# Patient Record
Sex: MALE | Race: WHITE | NOT HISPANIC OR LATINO | URBAN - METROPOLITAN AREA
[De-identification: names, ages, dates, MRNs, and addresses within clinical notes are randomized per-mention and may not be internally consistent; named-entity substitution may affect disease eponyms.]

---

## 2017-12-11 ENCOUNTER — APPOINTMENT (RX ONLY)
Dept: URBAN - METROPOLITAN AREA OTHER 4 | Facility: OTHER | Age: 62
Setting detail: DERMATOLOGY
End: 2017-12-11

## 2017-12-11 DIAGNOSIS — L81.4 OTHER MELANIN HYPERPIGMENTATION: ICD-10-CM

## 2017-12-11 DIAGNOSIS — D22 MELANOCYTIC NEVI: ICD-10-CM

## 2017-12-11 DIAGNOSIS — L91.8 OTHER HYPERTROPHIC DISORDERS OF THE SKIN: ICD-10-CM

## 2017-12-11 DIAGNOSIS — L82.1 OTHER SEBORRHEIC KERATOSIS: ICD-10-CM

## 2017-12-11 DIAGNOSIS — F17.200 NICOTINE DEPENDENCE, UNSPECIFIED, UNCOMPLICATED: ICD-10-CM

## 2017-12-11 DIAGNOSIS — L57.0 ACTINIC KERATOSIS: ICD-10-CM

## 2017-12-11 DIAGNOSIS — D18.0 HEMANGIOMA: ICD-10-CM

## 2017-12-11 PROBLEM — D22.5 MELANOCYTIC NEVI OF TRUNK: Status: ACTIVE | Noted: 2017-12-11

## 2017-12-11 PROBLEM — I10 ESSENTIAL (PRIMARY) HYPERTENSION: Status: ACTIVE | Noted: 2017-12-11

## 2017-12-11 PROBLEM — D18.01 HEMANGIOMA OF SKIN AND SUBCUTANEOUS TISSUE: Status: ACTIVE | Noted: 2017-12-11

## 2017-12-11 PROCEDURE — ? LIQUID NITROGEN

## 2017-12-11 PROCEDURE — 99203 OFFICE O/P NEW LOW 30 MIN: CPT | Mod: 25

## 2017-12-11 PROCEDURE — ? BENIGN DESTRUCTION COSMETIC

## 2017-12-11 PROCEDURE — 17000 DESTRUCT PREMALG LESION: CPT

## 2017-12-11 PROCEDURE — ? COUNSELING

## 2017-12-11 ASSESSMENT — LOCATION ZONE DERM
LOCATION ZONE: AXILLAE
LOCATION ZONE: FACE
LOCATION ZONE: TRUNK
LOCATION ZONE: NOSE
LOCATION ZONE: ARM
LOCATION ZONE: LIP

## 2017-12-11 ASSESSMENT — LOCATION DETAILED DESCRIPTION DERM
LOCATION DETAILED: LEFT SUPERIOR MEDIAL UPPER BACK
LOCATION DETAILED: RIGHT INFERIOR CENTRAL MALAR CHEEK
LOCATION DETAILED: LEFT ANTERIOR MEDIAL PROXIMAL UPPER ARM
LOCATION DETAILED: LEFT SUPERIOR VERMILION LIP
LOCATION DETAILED: LEFT INFERIOR CENTRAL MALAR CHEEK
LOCATION DETAILED: LEFT NASAL SIDEWALL
LOCATION DETAILED: LEFT AXILLARY VAULT
LOCATION DETAILED: RIGHT CENTRAL MALAR CHEEK
LOCATION DETAILED: LEFT MID-UPPER BACK
LOCATION DETAILED: RIGHT AXILLARY VAULT

## 2017-12-11 ASSESSMENT — LOCATION SIMPLE DESCRIPTION DERM
LOCATION SIMPLE: LEFT NOSE
LOCATION SIMPLE: LEFT UPPER BACK
LOCATION SIMPLE: RIGHT CHEEK
LOCATION SIMPLE: LEFT LIP
LOCATION SIMPLE: LEFT AXILLARY VAULT
LOCATION SIMPLE: RIGHT AXILLARY VAULT
LOCATION SIMPLE: LEFT UPPER ARM
LOCATION SIMPLE: LEFT CHEEK

## 2017-12-11 ASSESSMENT — TOTAL NUMBER OF LESIONS: # OF LESIONS?: 1

## 2017-12-11 ASSESSMENT — PAIN INTENSITY VAS: HOW INTENSE IS YOUR PAIN 0 BEING NO PAIN, 10 BEING THE MOST SEVERE PAIN POSSIBLE?: NO PAIN

## 2017-12-11 NOTE — PROCEDURE: LIQUID NITROGEN
Post-Care Instructions: I reviewed with the patient in detail post-care instructions. Patient is to wear sunprotection, and avoid picking at any of the treated lesions. Pt may apply Vaseline to crusted or scabbing areas.
Detail Level: Simple
Render Post-Care Instructions In Note?: no
Consent: The patient's consent was obtained including but not limited to risks of crusting, scabbing, blistering, scarring, darker or lighter pigmentary change, recurrence, incomplete removal and infection.
Duration Of Freeze Thaw-Cycle (Seconds): 5
Number Of Freeze-Thaw Cycles: 1 freeze-thaw cycle

## 2017-12-11 NOTE — PROCEDURE: BENIGN DESTRUCTION COSMETIC
Post-Care Instructions: I reviewed with the patient in detail post-care instructions. Patient is to wear sunprotection, and avoid picking at any of the treated lesions. Pt may apply Vaseline to crusted or scabbing areas.
Consent: The patient's consent was obtained including but not limited to risks of crusting, scabbing, blistering, scarring, darker or lighter pigmentary change, recurrence, incomplete removal and infection.
Anesthesia Type: 1% lidocaine without epinephrine
Price (Use Numbers Only, No Special Characters Or $): 0
Anesthesia Volume In Cc: 0.2
Detail Level: Simple

## 2018-07-25 ENCOUNTER — APPOINTMENT (RX ONLY)
Dept: URBAN - METROPOLITAN AREA OTHER 4 | Facility: OTHER | Age: 63
Setting detail: DERMATOLOGY
End: 2018-07-25

## 2018-07-25 DIAGNOSIS — I78.1 NEVUS, NON-NEOPLASTIC: ICD-10-CM

## 2018-07-25 DIAGNOSIS — L81.5 LEUKODERMA, NOT ELSEWHERE CLASSIFIED: ICD-10-CM

## 2018-07-25 DIAGNOSIS — L72.0 EPIDERMAL CYST: ICD-10-CM

## 2018-07-25 DIAGNOSIS — L81.4 OTHER MELANIN HYPERPIGMENTATION: ICD-10-CM

## 2018-07-25 PROCEDURE — ? BENIGN DESTRUCTION COSMETIC

## 2018-07-25 PROCEDURE — ? COUNSELING

## 2018-07-25 PROCEDURE — 99213 OFFICE O/P EST LOW 20 MIN: CPT

## 2018-07-25 ASSESSMENT — LOCATION DETAILED DESCRIPTION DERM
LOCATION DETAILED: LEFT INFERIOR CENTRAL MALAR CHEEK
LOCATION DETAILED: RIGHT NASAL ALA
LOCATION DETAILED: EPIGASTRIC SKIN
LOCATION DETAILED: LEFT LATERAL MALAR CHEEK
LOCATION DETAILED: LEFT PROXIMAL PRETIBIAL REGION
LOCATION DETAILED: RIGHT DISTAL PRETIBIAL REGION
LOCATION DETAILED: LEFT CENTRAL MALAR CHEEK
LOCATION DETAILED: RIGHT CENTRAL MALAR CHEEK
LOCATION DETAILED: RIGHT INFERIOR CENTRAL MALAR CHEEK
LOCATION DETAILED: RIGHT NASAL SIDEWALL
LOCATION DETAILED: STERNUM
LOCATION DETAILED: RIGHT SUPERIOR LATERAL MALAR CHEEK

## 2018-07-25 ASSESSMENT — LOCATION SIMPLE DESCRIPTION DERM
LOCATION SIMPLE: LEFT PRETIBIAL REGION
LOCATION SIMPLE: RIGHT NOSE
LOCATION SIMPLE: CHEST
LOCATION SIMPLE: RIGHT PRETIBIAL REGION
LOCATION SIMPLE: ABDOMEN
LOCATION SIMPLE: LEFT CHEEK
LOCATION SIMPLE: RIGHT CHEEK

## 2018-07-25 ASSESSMENT — LOCATION ZONE DERM
LOCATION ZONE: TRUNK
LOCATION ZONE: NOSE
LOCATION ZONE: FACE
LOCATION ZONE: LEG

## 2018-07-25 NOTE — PROCEDURE: BENIGN DESTRUCTION COSMETIC
Consent: The patient's consent was obtained including but not limited to risks of crusting, scabbing, blistering, scarring, darker or lighter pigmentary change, recurrence, incomplete removal and infection.
Detail Level: Simple
Anesthesia Type: 1% lidocaine without epinephrine
Price (Use Numbers Only, No Special Characters Or $): 0
Anesthesia Volume In Cc: 0.2
Post-Care Instructions: I reviewed with the patient in detail post-care instructions. Patient is to wear sunprotection, and avoid picking at any of the treated lesions. Pt may apply Vaseline to crusted or scabbing areas.
Detail Level: Zone

## 2020-09-01 ENCOUNTER — APPOINTMENT (RX ONLY)
Dept: URBAN - METROPOLITAN AREA OTHER 4 | Facility: OTHER | Age: 65
Setting detail: DERMATOLOGY
End: 2020-09-01

## 2020-09-01 DIAGNOSIS — L73.8 OTHER SPECIFIED FOLLICULAR DISORDERS: ICD-10-CM

## 2020-09-01 DIAGNOSIS — L81.4 OTHER MELANIN HYPERPIGMENTATION: ICD-10-CM

## 2020-09-01 DIAGNOSIS — D18.0 HEMANGIOMA: ICD-10-CM

## 2020-09-01 DIAGNOSIS — D22 MELANOCYTIC NEVI: ICD-10-CM

## 2020-09-01 PROBLEM — D18.01 HEMANGIOMA OF SKIN AND SUBCUTANEOUS TISSUE: Status: ACTIVE | Noted: 2020-09-01

## 2020-09-01 PROBLEM — D22.5 MELANOCYTIC NEVI OF TRUNK: Status: ACTIVE | Noted: 2020-09-01

## 2020-09-01 PROCEDURE — ? COUNSELING

## 2020-09-01 PROCEDURE — ? BENIGN DESTRUCTION COSMETIC

## 2020-09-01 PROCEDURE — 99214 OFFICE O/P EST MOD 30 MIN: CPT

## 2020-09-01 ASSESSMENT — LOCATION SIMPLE DESCRIPTION DERM
LOCATION SIMPLE: ABDOMEN
LOCATION SIMPLE: RIGHT CHEEK
LOCATION SIMPLE: UPPER BACK
LOCATION SIMPLE: LEFT UPPER BACK

## 2020-09-01 ASSESSMENT — LOCATION DETAILED DESCRIPTION DERM
LOCATION DETAILED: INFERIOR THORACIC SPINE
LOCATION DETAILED: EPIGASTRIC SKIN
LOCATION DETAILED: LEFT MEDIAL UPPER BACK
LOCATION DETAILED: RIGHT MEDIAL MALAR CHEEK

## 2020-09-01 ASSESSMENT — LOCATION ZONE DERM
LOCATION ZONE: TRUNK
LOCATION ZONE: FACE

## 2020-09-01 NOTE — PROCEDURE: BENIGN DESTRUCTION COSMETIC
Detail Level: Simple
Consent: The patient's consent was obtained including but not limited to risks of crusting, scabbing, blistering, scarring, darker or lighter pigmentary change, recurrence, incomplete removal and infection.
Post-Care Instructions: I reviewed with the patient in detail post-care instructions. Patient is to wear sunprotection, and avoid picking at any of the treated lesions. Pt may apply Vaseline to crusted or scabbing areas.
Price (Use Numbers Only, No Special Characters Or $): 0
Anesthesia Type: 1% lidocaine without epinephrine
Anesthesia Volume In Cc: 0.2

## 2020-10-26 ENCOUNTER — APPOINTMENT (RX ONLY)
Dept: URBAN - METROPOLITAN AREA OTHER 4 | Facility: OTHER | Age: 65
Setting detail: DERMATOLOGY
End: 2020-10-26

## 2020-10-26 DIAGNOSIS — L73.8 OTHER SPECIFIED FOLLICULAR DISORDERS: ICD-10-CM

## 2020-10-26 PROBLEM — D48.5 NEOPLASM OF UNCERTAIN BEHAVIOR OF SKIN: Status: ACTIVE | Noted: 2020-10-26

## 2020-10-26 PROCEDURE — ? OTHER

## 2020-10-26 PROCEDURE — 99213 OFFICE O/P EST LOW 20 MIN: CPT

## 2020-10-26 PROCEDURE — ? COUNSELING

## 2020-10-26 ASSESSMENT — LOCATION ZONE DERM: LOCATION ZONE: FACE

## 2020-10-26 ASSESSMENT — LOCATION SIMPLE DESCRIPTION DERM: LOCATION SIMPLE: RIGHT CHEEK

## 2020-10-26 ASSESSMENT — LOCATION DETAILED DESCRIPTION DERM: LOCATION DETAILED: RIGHT CENTRAL MALAR CHEEK

## 2020-12-15 ENCOUNTER — APPOINTMENT (RX ONLY)
Dept: URBAN - METROPOLITAN AREA OTHER 4 | Facility: OTHER | Age: 65
Setting detail: DERMATOLOGY
End: 2020-12-15

## 2020-12-15 DIAGNOSIS — L73.8 OTHER SPECIFIED FOLLICULAR DISORDERS: ICD-10-CM

## 2020-12-15 PROBLEM — D48.5 NEOPLASM OF UNCERTAIN BEHAVIOR OF SKIN: Status: ACTIVE | Noted: 2020-12-15

## 2020-12-15 PROCEDURE — ? OTHER

## 2020-12-15 PROCEDURE — ? COUNSELING

## 2020-12-15 PROCEDURE — 99213 OFFICE O/P EST LOW 20 MIN: CPT

## 2020-12-15 ASSESSMENT — LOCATION ZONE DERM: LOCATION ZONE: FACE

## 2020-12-15 ASSESSMENT — LOCATION DETAILED DESCRIPTION DERM: LOCATION DETAILED: RIGHT CENTRAL MALAR CHEEK

## 2020-12-15 ASSESSMENT — LOCATION SIMPLE DESCRIPTION DERM: LOCATION SIMPLE: RIGHT CHEEK

## 2020-12-15 NOTE — PROCEDURE: OTHER
Other (Free Text): Hyphed
Note Text (......Xxx Chief Complaint.): This diagnosis correlates with the
Detail Level: Simple

## 2023-06-22 ENCOUNTER — WEB ENCOUNTER (OUTPATIENT)
Dept: URBAN - METROPOLITAN AREA CLINIC 98 | Facility: CLINIC | Age: 68
End: 2023-06-22

## 2023-06-22 ENCOUNTER — OFFICE VISIT (OUTPATIENT)
Dept: URBAN - METROPOLITAN AREA CLINIC 98 | Facility: CLINIC | Age: 68
End: 2023-06-22
Payer: COMMERCIAL

## 2023-06-22 VITALS
TEMPERATURE: 97.3 F | HEIGHT: 68 IN | BODY MASS INDEX: 23.07 KG/M2 | SYSTOLIC BLOOD PRESSURE: 168 MMHG | HEART RATE: 82 BPM | WEIGHT: 152.2 LBS | DIASTOLIC BLOOD PRESSURE: 111 MMHG

## 2023-06-22 DIAGNOSIS — K74.69 OTHER CIRRHOSIS OF LIVER: ICD-10-CM

## 2023-06-22 DIAGNOSIS — K76.82 HEPATIC ENCEPHALOPATHY: ICD-10-CM

## 2023-06-22 PROBLEM — 19943007: Status: ACTIVE | Noted: 2023-06-22

## 2023-06-22 PROCEDURE — 99204 OFFICE O/P NEW MOD 45 MIN: CPT | Performed by: INTERNAL MEDICINE

## 2023-06-22 RX ORDER — ZOLPIDEM TARTRATE 5 MG/1
1 TABLET AT BEDTIME TABLET, FILM COATED ORAL ONCE A DAY
Status: ACTIVE | COMMUNITY

## 2023-06-22 RX ORDER — METFORMIN HYDROCHLORIDE 500 MG/1
2 TABLET WITH A MEAL TABLET, FILM COATED ORAL ONCE A DAY
Status: ACTIVE | COMMUNITY

## 2023-06-22 RX ORDER — AMLODIPINE BESYLATE 5 MG/1
1 TABLET TABLET ORAL ONCE A DAY
Status: ACTIVE | COMMUNITY

## 2023-06-22 RX ORDER — GLIPIZIDE 10 MG/1
1 TABLET 30 MINUTES BEFORE BREAKFAST TABLET ORAL ONCE A DAY
Status: ACTIVE | COMMUNITY

## 2023-06-22 RX ORDER — RIFAXIMIN 550 MG/1
1 TABLET TABLET ORAL TWICE A DAY
Qty: 180 TABLET | Refills: 3 | OUTPATIENT
Start: 2023-06-22 | End: 2024-06-16

## 2023-06-22 NOTE — PHYSICAL EXAM NEUROLOGIC:
oriented to person, place and time ; short and long term memory intact; slowed cognition ; grade 1 HE

## 2023-06-22 NOTE — HPI-TODAY'S VISIT:
HEre on self referral  daughter in law is a nurse at [employer]  He was diagnosed with liver disease in [place] ; listed for liver transplant at [facility]  Established at [place] 2016 but MELD improved.   Pt admitted to [facility] 6/9/- 6/12 w fall and hepatic encephalopathy  has a h/o HCV treated w SVR  PVT non occlusive  TIPS for refractory GI bleeding . Vero daughter and Shira had several falls : fell off a ladder and tripped over a dog taking lactulose 1-none  having a lot of gas had been drinking alcohol - a few glasses of wine a night w dinenr

## 2023-06-22 NOTE — HPI-OTHER HISTORIES
06/12/23 02:19 Sodium: 138 Potassium: 3.7 Chloride: 108 (H) Carbon Dioxide: 23 ANION GAP: 7 Calculated Osmolality: 280 Blood Urea Nitrogen: 15 Creatinine: 0.55 (L) U:C: 27 (H) Estimated GFR: 108 Glucose: 140 (H) Alkaline Phosphatase: 89 Aspartate Aminotransferase: 27 Alanine Aminotransferase: 22 Calcium Level Total: 8.3 (L) Magnesium Level: 1.6 (L) Protein Total: 5.3 (L) Albumin: 2.9 (L) Bilirubin Total: 1.5 (H)  Most Recent White Blood Cell: 6.1 6/11/23 00:54 Red Blood Cell: 3.89 (L) 6/11/23 00:54 Hemoglobin: 13.3 6/11/23 00:54 Hematocrit: 36.6 (L) 6/11/23 00:54 Mean Corpuscular Volume: 94.1 (H) 6/11/23 00:54 Mean Corpuscular Hemoglobin: 34.2 (H) 6/11/23 00:54 Mean Corpuscular Hemoglobin Concentration: 36.3 (H) 6/11/23 00:54 Platelet: 129 (L) 6/11/23 00:54 MPV: 9.5 6/11/23 00:54 Red Cell Distribution Width Coefficient of [place].: 13.6 6/11/23 00:54  Most Recent Prothrombin Time: 14.7 (H) 6/11/23 00:54 International Normalization Ratio: 1.30 6/11/23 00:54 Most Recent Hepatitis C Viral RNA Quant  by  PCR: Undetected 12/13/17 10:45   Narrative & Impression 6/2023  EXAM: US ABDOMEN LIMITED, US ABDOMEN DOPPLER COMPLETE   CLINICAL INDICATION: tips/ LIver cirrhosis.   TECHNIQUE: Grayscale, pulsed wave and color Doppler sonography of the right upper quadrant were performed. [id number].[id number]   COMPARISON: None.   FINDINGS:    Liver: Increased echogenicity. Nodular liver contours. No lesions.   Bile Ducts: No dilated intrahepatic biliary radicles. Measures 4 mm.   Gallbladder: Gallbladder sludge. Ceja's sign is negative.   Pancreas: Obscured by overlying structures.   Right Kidney: Not imaged.   Aorta: Normal.   IVC: Normal.   Hepatic Veins: Normal.   Portal Veins: Hepatopetal flow.   Hepatic Arteries: Peak systolic velocity 42.2, - 49.9 cm/s. Resistive index 0.68.   TIPS: Stent is patent. Hepatopetal flow. Velocity in the portal end 89.5 cm/s, previously 69 cm/s. Velocity at the hepatic end 85.2 cm/s, previously 25 cm/s.   Other: None.   IMPRESSION: Patent hepatic vasculature, specifically patent TIPS graft.   MRI [facility] 6/14/23 EXAM: MR ABDOMEN W AND WO CONTRAST   CLINICAL INDICATION: HCC surveillance. History of chronic hepatitis C.   TECHNIQUE: Multisequence, multiplanar MRI of the abdomen was performed without and with intravenous contrast. [id number]   COMPARISON: MRI abdomen 6/9/2020   FINDINGS:   Lower Thorax: Bilateral gynecomastia.   Liver: Iron deposition. No suspicious liver lesions.   Gallbladder/Biliary Tree: Normal.   Spleen: Siderotic nodules.   Pancreas: Unilocular cystic lesions in the pancreas body and tail, which are grossly unchanged in size from 2020. Additional septated 0.9 x 0.7 cm cystic lesion in the uncinate process is increased in size from 2020 where it measured 0.6 x 0.6 cm. No pancreatic duct dilatation.   Adrenal Glands: Normal.    Kidneys/Ureters: Renal cysts.   Gastrointestinal: Normal.    Lymph Nodes: Normal.   Vessels: TIPS is patent. Cavernous transformation of the portal vein with similar appearing peripheral nonocclusive thrombus in the portal confluence and main portal vein. SMV and splenic veins are patent. Paraesophageal, perigastric, and perisplenic varices.   Peritoneum/Retroperitoneum: Normal.   Bones/Soft Tissues: Degenerative changes in the spine. Probable small intraosseous hemangioma in L2.   IMPRESSION:   1.  Morphologic changes of chronic liver disease without findings of HCC. 2.  Patent TIPS. 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 3.  Several unilocular and septated cystic lesions in the pancreas, one of which is minimally increased in size from prior, most compatible with IPMNs. No suspicious features.   The images were reviewed and interpreted by Donal Ledezma MD.

## 2023-07-03 ENCOUNTER — TELEPHONE ENCOUNTER (OUTPATIENT)
Dept: URBAN - METROPOLITAN AREA CLINIC 6 | Facility: CLINIC | Age: 68
End: 2023-07-03

## 2023-08-03 ENCOUNTER — LAB OUTSIDE AN ENCOUNTER (OUTPATIENT)
Dept: URBAN - METROPOLITAN AREA CLINIC 98 | Facility: CLINIC | Age: 68
End: 2023-08-03

## 2023-08-12 LAB
A/G RATIO: 1.6
ALBUMIN: 3.5
ALKALINE PHOSPHATASE: 109
ALT (SGPT): 17
AST (SGOT): 18
BASO (ABSOLUTE): 0.1
BASOS: 1
BILIRUBIN, TOTAL: 2.4
BUN/CREATININE RATIO: 9
BUN: 7
CALCIUM: 9.2
CARBON DIOXIDE, TOTAL: 24
CHLORIDE: 102
CREATININE: 0.74
EGFR: 99
EOS (ABSOLUTE): 0.1
EOS: 2
GLOBULIN, TOTAL: 2.2
GLUCOSE: 306
HEMATOCRIT: 44.1
HEMATOLOGY COMMENTS:: (no result)
HEMOGLOBIN: 15.4
IMMATURE CELLS: (no result)
IMMATURE GRANS (ABS): 0
IMMATURE GRANULOCYTES: 0
INR: 1.4
LYMPHS (ABSOLUTE): 1.7
LYMPHS: 26
MCH: 34.5
MCHC: 34.9
MCV: 99
MONOCYTES(ABSOLUTE): 0.6
MONOCYTES: 10
NEUTROPHILS (ABSOLUTE): 3.9
NEUTROPHILS: 61
NRBC: (no result)
PLATELETS: (no result)
POTASSIUM: 3.6
PROTEIN, TOTAL: 5.7
PROTHROMBIN TIME: 14.4
RBC: 4.47
RDW: 12.2
SODIUM: 137
WBC: 6.3

## 2023-08-15 ENCOUNTER — OFFICE VISIT (OUTPATIENT)
Dept: URBAN - METROPOLITAN AREA CLINIC 98 | Facility: CLINIC | Age: 68
End: 2023-08-15
Payer: COMMERCIAL

## 2023-08-15 VITALS
WEIGHT: 153.6 LBS | SYSTOLIC BLOOD PRESSURE: 140 MMHG | HEIGHT: 68 IN | DIASTOLIC BLOOD PRESSURE: 77 MMHG | TEMPERATURE: 98 F | HEART RATE: 76 BPM | BODY MASS INDEX: 23.28 KG/M2

## 2023-08-15 DIAGNOSIS — K74.69 OTHER CIRRHOSIS OF LIVER: ICD-10-CM

## 2023-08-15 DIAGNOSIS — K76.82 HEPATIC ENCEPHALOPATHY: ICD-10-CM

## 2023-08-15 DIAGNOSIS — E08.65 DIABETES MELLITUS DUE TO UNDERLYING CONDITION, UNCONTROLLED, WITH HYPERGLYCEMIA: ICD-10-CM

## 2023-08-15 DIAGNOSIS — R45.84 ANHEDONIA: ICD-10-CM

## 2023-08-15 PROBLEM — 8801005: Status: ACTIVE | Noted: 2023-08-13

## 2023-08-15 PROCEDURE — 99214 OFFICE O/P EST MOD 30 MIN: CPT | Performed by: INTERNAL MEDICINE

## 2023-08-15 RX ORDER — AMLODIPINE BESYLATE 5 MG/1
1 TABLET TABLET ORAL ONCE A DAY
Status: ACTIVE | COMMUNITY

## 2023-08-15 RX ORDER — ZOLPIDEM TARTRATE 5 MG/1
1 TABLET AT BEDTIME TABLET, FILM COATED ORAL ONCE A DAY
Status: ACTIVE | COMMUNITY

## 2023-08-15 RX ORDER — GLIPIZIDE 10 MG/1
1 TABLET 30 MINUTES BEFORE BREAKFAST TABLET ORAL ONCE A DAY
Status: ACTIVE | COMMUNITY

## 2023-08-15 RX ORDER — METFORMIN HYDROCHLORIDE 500 MG/1
2 TABLET WITH A MEAL TABLET, FILM COATED ORAL ONCE A DAY
Status: ACTIVE | COMMUNITY

## 2023-08-15 RX ORDER — RIFAXIMIN 550 MG/1
1 TABLET TABLET ORAL TWICE A DAY
Qty: 180 TABLET | Refills: 3 | Status: ACTIVE | COMMUNITY
Start: 2023-06-22 | End: 2024-06-16

## 2023-08-15 NOTE — HPI-TODAY'S VISIT:
Here to follow up  recent labs MELD Na 14, Meld 3.0 is 13  Xifaxan approved good and bad days.  some fatigue, lack of motivation, unable to complete task , don't want to get out of bed took a neighbor's Adderall and that helped.   less hungry he doesn't like lactulose and refuses to use again

## 2023-10-15 ENCOUNTER — LAB OUTSIDE AN ENCOUNTER (OUTPATIENT)
Dept: URBAN - METROPOLITAN AREA CLINIC 98 | Facility: CLINIC | Age: 68
End: 2023-10-15

## 2023-10-17 ENCOUNTER — OFFICE VISIT (OUTPATIENT)
Dept: URBAN - METROPOLITAN AREA CLINIC 98 | Facility: CLINIC | Age: 68
End: 2023-10-17
Payer: COMMERCIAL

## 2023-10-17 DIAGNOSIS — K74.69 OTHER CIRRHOSIS OF LIVER: ICD-10-CM

## 2023-10-17 DIAGNOSIS — K86.2 PANCREATIC CYST: ICD-10-CM

## 2023-10-17 DIAGNOSIS — F32.A DEPRESSION, UNSPECIFIED DEPRESSION TYPE: ICD-10-CM

## 2023-10-17 DIAGNOSIS — Z12.11 COLON CANCER SCREENING: ICD-10-CM

## 2023-10-17 DIAGNOSIS — K76.82 HEPATIC ENCEPHALOPATHY: ICD-10-CM

## 2023-10-17 PROCEDURE — 99214 OFFICE O/P EST MOD 30 MIN: CPT

## 2023-10-17 RX ORDER — ZOLPIDEM TARTRATE 5 MG/1
1 TABLET AT BEDTIME TABLET, FILM COATED ORAL ONCE A DAY
Status: ACTIVE | COMMUNITY

## 2023-10-17 RX ORDER — RIFAXIMIN 550 MG/1
1 TABLET TABLET ORAL TWICE A DAY
Qty: 180 TABLET | Refills: 3 | Status: ACTIVE | COMMUNITY
Start: 2023-06-22 | End: 2024-06-16

## 2023-10-17 RX ORDER — METFORMIN HYDROCHLORIDE 500 MG/1
2 TABLET WITH A MEAL TABLET, FILM COATED ORAL ONCE A DAY
Status: ACTIVE | COMMUNITY

## 2023-10-17 RX ORDER — GLIPIZIDE 10 MG/1
1 TABLET 30 MINUTES BEFORE BREAKFAST TABLET ORAL ONCE A DAY
Status: ACTIVE | COMMUNITY

## 2023-10-17 RX ORDER — AMLODIPINE BESYLATE 5 MG/1
1 TABLET TABLET ORAL ONCE A DAY
Status: ACTIVE | COMMUNITY

## 2023-10-17 NOTE — HPI-TODAY'S VISIT:
Mr. Garcia is a 68 year old male patient with cirrhosis here for his 2 month follow-up.  Last seen by Dr. Leon on 8/15/2023. - Most recent MELD Na 14, MELD 3.0 is 13 ; calculated from 8/3/23 labs - Patient was unable to get labs drawn before todays visit. - Last visit started Xifaxin; states "I do not like lactulose and will never take that again." - Feeling better but still having fatigue - Memory and confusion have improved since starting Xifaxin - Noticed an increase in constipation; uses Miralax as needed for relief with good results  - Started on Lexapro 2 months ago; feels less depressed and went back to work 2 weeks ago - Last ETOH 2/2023 - Wants to discuss how long he will be on Xifaxin due to the cost - Due for colonoscopy per patient.  Last IMAGING Narrative & Impression 6/2023        EXAM: US ABDOMEN LIMITED, US ABDOMEN DOPPLER COMPLETE        CLINICAL INDICATION: tips/ LIver cirrhosis.        TECHNIQUE: Grayscale, pulsed wave and color Doppler sonography of the right upper quadrant were performed. ESRC.[id number]        COMPARISON: None.        FINDINGS:        Liver: Increased echogenicity. Nodular liver contours. No lesions.        Bile Ducts: No dilated intrahepatic biliary radicles. Measures 4 mm.        Gallbladder: Gallbladder sludge. Ceja's sign is negative.        Pancreas: Obscured by overlying structures.        Right Kidney: Not imaged.        Aorta: Normal.        IVC: Normal.        Hepatic Veins: Normal.        Portal Veins: Hepatopetal flow.        Hepatic Arteries: Peak systolic velocity 42.2, - 49.9 cm/s. Resistive index 0.68.        TIPS: Stent is patent. Hepatopetal flow. Velocity in the portal end 89.5 cm/s, previously 69 cm/s. Velocity at the hepatic end 85.2 cm/s, previously 25 cm/s.        Other: None.        IMPRESSION:        Patent hepatic vasculature, specifically patent TIPS graft.        MRI [facility] 6/14/23        EXAM: MR ABDOMEN W AND WO CONTRAST        CLINICAL INDICATION: HCC surveillance. History of chronic hepatitis C.        TECHNIQUE: Multisequence, multiplanar MRI of the abdomen was performed without and with intravenous contrast. ESRC.[id number]        COMPARISON: MRI abdomen 6/9/2020        FINDINGS:        Lower Thorax: Bilateral gynecomastia.        Liver: Iron deposition. No suspicious liver lesions.        Gallbladder/Biliary Tree: Normal.        Spleen: Siderotic nodules.        Pancreas: Unilocular cystic lesions in the pancreas body and tail, which are grossly unchanged in size from 2020. Additional septated 0.9 x 0.7 cm cystic lesion in the uncinate process is increased in size from 2020 where it measured 0.6 x 0.6 cm. No pancreatic duct dilatation.        Adrenal Glands: Normal.        Kidneys/Ureters: Renal cysts.        Gastrointestinal: Normal.        Lymph Nodes: Normal.        Vessels: TIPS is patent. Cavernous transformation of the portal vein with similar appearing peripheral nonocclusive thrombus in the portal confluence and main portal vein. SMV and splenic veins are patent. Paraesophageal, perigastric, and perisplenic varices.        Peritoneum/Retroperitoneum: Normal.        Bones/Soft Tissues: Degenerative changes in the spine. Probable small intraosseous hemangioma in L2.        IMPRESSION:        1. Morphologic changes of chronic liver disease without findings of HCC.        2. Patent TIPS. 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        3. Several unilocular and septated cystic lesions in the pancreas, one of which is minimally increased in size from prior, most compatible with IPMNs. No suspicious features.        The images were reviewed and interpreted by Donal Ledezma M.D.

## 2023-10-23 ENCOUNTER — TELEPHONE ENCOUNTER (OUTPATIENT)
Dept: URBAN - METROPOLITAN AREA CLINIC 98 | Facility: CLINIC | Age: 68
End: 2023-10-23

## 2023-10-23 LAB
A/G RATIO: 1.9
ALBUMIN: 3.8
ALKALINE PHOSPHATASE: 180
ALT (SGPT): 19
AMMONIA, PLASMA: 158
AST (SGOT): 21
BASO (ABSOLUTE): 0.1
BASOS: 1
BILIRUBIN, TOTAL: 1.5
BUN/CREATININE RATIO: 14
BUN: 11
CALCIUM: 10.1
CARBON DIOXIDE, TOTAL: 29
CHLORIDE: 99
CREATININE: 0.77
EGFR: 98
EOS (ABSOLUTE): 0.1
EOS: 2
FREE TESTOSTERONE(DIRECT): 8.4
GLOBULIN, TOTAL: 2
GLUCOSE: 309
HEMATOCRIT: 46.5
HEMATOLOGY COMMENTS:: (no result)
HEMOGLOBIN: 16.7
IMMATURE CELLS: (no result)
IMMATURE GRANS (ABS): 0
IMMATURE GRANULOCYTES: 0
INR: 1.2
LYMPHS (ABSOLUTE): 1.5
LYMPHS: 24
MCH: 34.7
MCHC: 35.9
MCV: 97
MONOCYTES(ABSOLUTE): 0.6
MONOCYTES: 10
NEUTROPHILS (ABSOLUTE): 4
NEUTROPHILS: 63
NRBC: (no result)
PLATELETS: 96
POTASSIUM: 3.9
PROTEIN, TOTAL: 5.8
PROTHROMBIN TIME: 12.7
RBC: 4.81
RDW: 12.6
SODIUM: 138
TESTOSTERONE, SERUM: 1155
WBC: 6.3

## 2023-12-18 ENCOUNTER — LAB OUTSIDE AN ENCOUNTER (OUTPATIENT)
Dept: URBAN - METROPOLITAN AREA CLINIC 98 | Facility: CLINIC | Age: 68
End: 2023-12-18

## 2023-12-18 ENCOUNTER — DASHBOARD ENCOUNTERS (OUTPATIENT)
Age: 68
End: 2023-12-18

## 2023-12-18 ENCOUNTER — OFFICE VISIT (OUTPATIENT)
Dept: URBAN - METROPOLITAN AREA CLINIC 98 | Facility: CLINIC | Age: 68
End: 2023-12-18
Payer: COMMERCIAL

## 2023-12-18 VITALS
HEART RATE: 72 BPM | WEIGHT: 150 LBS | DIASTOLIC BLOOD PRESSURE: 66 MMHG | TEMPERATURE: 98.6 F | BODY MASS INDEX: 22.73 KG/M2 | SYSTOLIC BLOOD PRESSURE: 124 MMHG | HEIGHT: 68 IN

## 2023-12-18 DIAGNOSIS — K86.2 PANCREATIC CYST: ICD-10-CM

## 2023-12-18 DIAGNOSIS — K74.69 OTHER CIRRHOSIS OF LIVER: ICD-10-CM

## 2023-12-18 DIAGNOSIS — F32.A DEPRESSION, UNSPECIFIED DEPRESSION TYPE: ICD-10-CM

## 2023-12-18 DIAGNOSIS — Z12.11 COLON CANCER SCREENING: ICD-10-CM

## 2023-12-18 DIAGNOSIS — K76.82 HEPATIC ENCEPHALOPATHY: ICD-10-CM

## 2023-12-18 DIAGNOSIS — I85.00 ESOPHAGEAL VARICES WITHOUT BLEEDING, UNSPECIFIED ESOPHAGEAL VARICES TYPE: ICD-10-CM

## 2023-12-18 PROBLEM — 14223005: Status: ACTIVE | Noted: 2023-12-18

## 2023-12-18 PROCEDURE — 99214 OFFICE O/P EST MOD 30 MIN: CPT

## 2023-12-18 RX ORDER — METFORMIN HYDROCHLORIDE 500 MG/1
2 TABLET WITH A MEAL TABLET, FILM COATED ORAL ONCE A DAY
Status: ACTIVE | COMMUNITY

## 2023-12-18 RX ORDER — ZOLPIDEM TARTRATE 5 MG/1
1 TABLET AT BEDTIME TABLET, FILM COATED ORAL ONCE A DAY
Status: ACTIVE | COMMUNITY

## 2023-12-18 RX ORDER — RIFAXIMIN 550 MG/1
1 TABLET TABLET ORAL TWICE A DAY
Qty: 180 TABLET | Refills: 3 | Status: ACTIVE | COMMUNITY
Start: 2023-06-22 | End: 2024-06-16

## 2023-12-18 RX ORDER — GLIPIZIDE 10 MG/1
1 TABLET 30 MINUTES BEFORE BREAKFAST TABLET ORAL ONCE A DAY
Status: ACTIVE | COMMUNITY

## 2023-12-18 RX ORDER — AMLODIPINE BESYLATE 5 MG/1
1 TABLET TABLET ORAL ONCE A DAY
Status: ACTIVE | COMMUNITY

## 2023-12-18 RX ORDER — POLYETHYLENE GLYCOL 3350, SODIUM CHLORIDE, SODIUM BICARBONATE, POTASSIUM CHLORIDE 420; 11.2; 5.72; 1.48 G/4L; G/4L; G/4L; G/4L
4000 ML POWDER, FOR SOLUTION ORAL AS DIRECTED
Qty: 4000 ML | Refills: 0 | OUTPATIENT
Start: 2023-12-18 | End: 2023-12-19

## 2023-12-18 NOTE — HPI-TODAY'S VISIT:
Mr. Garcia is a 68 year old male patient with cirrhosis here to follow-up cirrhosisi. PCP Dr. Ronaldo Enriquez; progress note will be sent to office following today's visit Today 12/28/2023 - MELD Na 10 and MELD 3.0 > 10   - Taking Xifaxan twice a dayfor the last 4 months - Memory and confusion has improved - Continues to use Miralax and prunes for occassional constipation - Continues to take Lexapro for depression and feels like it helps - Has not had alcohol since 2/2023 - PCP increased Ozempic after last blood draw for DM control - Feeling ok with the holidays Visit 10/17/2023 Last seen by Dr. Leon on 8/15/2023. - Most recent MELD Na 14, MELD 3.0 is 13 ; calculated from 8/3/23 labs - Patient was unable to get labs drawn before todays visit. - Last visit started Xifaxin; states "I do not like lactulose and will never take that again." - Feeling better but still having fatigue - Memory and confusion have improved since starting Xifaxin - Noticed an increase in constipation; uses Miralax as needed for relief with good results  - Started on Lexapro 2 months ago; feels less depressed and went back to work 2 weeks ago - Last ETOH 2/2023 - Wants to discuss how long he will be on Xifaxin due to the cost - Due for colonoscopy per patient.  Last IMAGING Narrative & Impression 6/2023        EXAM: US ABDOMEN LIMITED, US ABDOMEN DOPPLER COMPLETE        CLINICAL INDICATION: tips/ LIver cirrhosis.        TECHNIQUE: Grayscale, pulsed wave and color Doppler sonography of the right upper quadrant were performed. ESRC.[id number]        COMPARISON: None.        FINDINGS:        Liver: Increased echogenicity. Nodular liver contours. No lesions.        Bile Ducts: No dilated intrahepatic biliary radicles. Measures 4 mm.        Gallbladder: Gallbladder sludge. Ceja's sign is negative.        Pancreas: Obscured by overlying structures.        Right Kidney: Not imaged.        Aorta: Normal.        IVC: Normal.        Hepatic Veins: Normal.        Portal Veins: Hepatopetal flow.        Hepatic Arteries: Peak systolic velocity 42.2, - 49.9 cm/s. Resistive index 0.68.        TIPS: Stent is patent. Hepatopetal flow. Velocity in the portal end 89.5 cm/s, previously 69 cm/s. Velocity at the hepatic end 85.2 cm/s, previously 25 cm/s.        Other: None.        IMPRESSION:        Patent hepatic vasculature, specifically patent TIPS graft.        MRI [facility] 6/14/23        EXAM: MR ABDOMEN W AND WO CONTRAST        CLINICAL INDICATION: HCC surveillance. History of chronic hepatitis C.        TECHNIQUE: Multisequence, multiplanar MRI of the abdomen was performed without and with intravenous contrast. ESRC.[id number]        COMPARISON: MRI abdomen 6/9/2020        FINDINGS:        Lower Thorax: Bilateral gynecomastia.        Liver: Iron deposition. No suspicious liver lesions.        Gallbladder/Biliary Tree: Normal.        Spleen: Siderotic nodules.        Pancreas: Unilocular cystic lesions in the pancreas body and tail, which are grossly unchanged in size from 2020. Additional septated 0.9 x 0.7 cm cystic lesion in the uncinate process is increased in size from 2020 where it measured 0.6 x 0.6 cm. No pancreatic duct dilatation.        Adrenal Glands: Normal.        Kidneys/Ureters: Renal cysts.        Gastrointestinal: Normal.        Lymph Nodes: Normal.        Vessels: TIPS is patent. Cavernous transformation of the portal vein with similar appearing peripheral nonocclusive thrombus in the portal confluence and main portal vein. SMV and splenic veins are patent. Paraesophageal, perigastric, and perisplenic varices.        Peritoneum/Retroperitoneum: Normal.        Bones/Soft Tissues: Degenerative changes in the spine. Probable small intraosseous hemangioma in L2.        IMPRESSION:        1. Morphologic changes of chronic liver disease without findings of HCC.        2. Patent TIPS. 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        3. Several unilocular and septated cystic lesions in the pancreas, one of which is minimally increased in size from prior, most compatible with IPMNs. No suspicious features.        The images were reviewed and interpreted by Donal Ledezma M.D.

## 2023-12-24 LAB
A/G RATIO: 1.9
ALBUMIN: 3.8
ALKALINE PHOSPHATASE: 127
ALT (SGPT): 23
AMMONIA, PLASMA: 112
AST (SGOT): 26
BASO (ABSOLUTE): 0
BASOS: 1
BILIRUBIN, TOTAL: 2.1
BUN/CREATININE RATIO: 14
BUN: 11
CALCIUM: 9
CARBON DIOXIDE, TOTAL: 26
CHLORIDE: 99
CREATININE: 0.8
EGFR: 96
EOS (ABSOLUTE): 0.2
EOS: 3
FREE TESTOSTERONE(DIRECT): 0.6
GLOBULIN, TOTAL: 2
GLUCOSE: 341
HEMATOCRIT: 42
HEMATOLOGY COMMENTS:: (no result)
HEMOGLOBIN: 15
IMMATURE CELLS: (no result)
IMMATURE GRANS (ABS): 0
IMMATURE GRANULOCYTES: 0
INR: 1.2
LYMPHS (ABSOLUTE): 1.4
LYMPHS: 22
MCH: 34.6
MCHC: 35.7
MCV: 97
MONOCYTES(ABSOLUTE): 0.6
MONOCYTES: 9
NEUTROPHILS (ABSOLUTE): 4.1
NEUTROPHILS: 65
NRBC: (no result)
PLATELETS: 98
POTASSIUM: 3.4
PROTEIN, TOTAL: 5.8
PROTHROMBIN TIME: 12.6
RBC: 4.34
RDW: 12.8
SODIUM: 138
TESTOSTERONE, SERUM: 89
WBC: 6.3

## 2023-12-28 ENCOUNTER — TELEPHONE ENCOUNTER (OUTPATIENT)
Dept: URBAN - METROPOLITAN AREA CLINIC 98 | Facility: CLINIC | Age: 68
End: 2023-12-28

## 2023-12-28 RX ORDER — POLYETHYLENE GLYCOL 3350, SODIUM CHLORIDE, SODIUM BICARBONATE, POTASSIUM CHLORIDE 420; 11.2; 5.72; 1.48 G/4L; G/4L; G/4L; G/4L
4000 ML POWDER, FOR SOLUTION ORAL AS DIRECTED
Qty: 4000 ML | Refills: 0
Start: 2023-12-18 | End: 2023-12-29

## 2023-12-28 RX ORDER — POTASSIUM CHLORIDE 1500 MG/1
1 TABLET WITH FOOD TABLET, EXTENDED RELEASE ORAL ONCE A DAY
Qty: 30 | Refills: 0 | OUTPATIENT
Start: 2023-12-28 | End: 2024-01-27

## 2024-01-04 ENCOUNTER — OFFICE VISIT (OUTPATIENT)
Dept: URBAN - METROPOLITAN AREA MEDICAL CENTER 28 | Facility: MEDICAL CENTER | Age: 69
End: 2024-01-04
Payer: COMMERCIAL

## 2024-01-04 ENCOUNTER — LAB OUTSIDE AN ENCOUNTER (OUTPATIENT)
Dept: URBAN - METROPOLITAN AREA CLINIC 98 | Facility: CLINIC | Age: 69
End: 2024-01-04

## 2024-01-04 DIAGNOSIS — K74.60 ADVANCED CIRRHOSIS: ICD-10-CM

## 2024-01-04 DIAGNOSIS — Z12.11 COLON CANCER SCREENING: ICD-10-CM

## 2024-01-04 DIAGNOSIS — K31.89 ACHYLIA: ICD-10-CM

## 2024-01-04 LAB
GLUCOSE POC: 151
PERFORMING LAB: (no result)

## 2024-01-04 PROCEDURE — G0121 COLON CA SCRN NOT HI RSK IND: HCPCS | Performed by: INTERNAL MEDICINE

## 2024-01-04 PROCEDURE — 43239 EGD BIOPSY SINGLE/MULTIPLE: CPT | Performed by: INTERNAL MEDICINE

## 2024-01-04 RX ORDER — METFORMIN HYDROCHLORIDE 500 MG/1
2 TABLET WITH A MEAL TABLET, FILM COATED ORAL ONCE A DAY
Status: ACTIVE | COMMUNITY

## 2024-01-04 RX ORDER — GLIPIZIDE 10 MG/1
1 TABLET 30 MINUTES BEFORE BREAKFAST TABLET ORAL ONCE A DAY
Status: ACTIVE | COMMUNITY

## 2024-01-04 RX ORDER — POTASSIUM CHLORIDE 1500 MG/1
1 TABLET WITH FOOD TABLET, EXTENDED RELEASE ORAL ONCE A DAY
Qty: 30 | Refills: 0 | Status: ACTIVE | COMMUNITY
Start: 2023-12-28 | End: 2024-01-27

## 2024-01-04 RX ORDER — ZOLPIDEM TARTRATE 5 MG/1
1 TABLET AT BEDTIME TABLET, FILM COATED ORAL ONCE A DAY
Status: ACTIVE | COMMUNITY

## 2024-01-04 RX ORDER — RIFAXIMIN 550 MG/1
1 TABLET TABLET ORAL TWICE A DAY
Qty: 180 TABLET | Refills: 3 | Status: ACTIVE | COMMUNITY
Start: 2023-06-22 | End: 2024-06-16

## 2024-01-04 RX ORDER — AMLODIPINE BESYLATE 5 MG/1
1 TABLET TABLET ORAL ONCE A DAY
Status: ACTIVE | COMMUNITY

## 2024-01-08 ENCOUNTER — TELEPHONE ENCOUNTER (OUTPATIENT)
Dept: URBAN - METROPOLITAN AREA CLINIC 98 | Facility: CLINIC | Age: 69
End: 2024-01-08

## 2024-01-30 ENCOUNTER — ERX REFILL RESPONSE (OUTPATIENT)
Dept: URBAN - METROPOLITAN AREA CLINIC 98 | Facility: CLINIC | Age: 69
End: 2024-01-30

## 2024-01-30 RX ORDER — POTASSIUM CHLORIDE 1500 MG/1
TAKE ONE TABLET BY MOUTH DAILY WITH FOOD TABLET, FILM COATED, EXTENDED RELEASE ORAL
Qty: 30 TABLET | Refills: 0 | OUTPATIENT

## 2024-06-27 ENCOUNTER — OFFICE VISIT (OUTPATIENT)
Dept: URBAN - METROPOLITAN AREA CLINIC 98 | Facility: CLINIC | Age: 69
End: 2024-06-27
Payer: MEDICARE

## 2024-06-27 ENCOUNTER — LAB OUTSIDE AN ENCOUNTER (OUTPATIENT)
Dept: URBAN - METROPOLITAN AREA CLINIC 98 | Facility: CLINIC | Age: 69
End: 2024-06-27

## 2024-06-27 VITALS
HEART RATE: 77 BPM | WEIGHT: 140 LBS | TEMPERATURE: 97.1 F | DIASTOLIC BLOOD PRESSURE: 69 MMHG | HEIGHT: 68 IN | SYSTOLIC BLOOD PRESSURE: 119 MMHG | BODY MASS INDEX: 21.22 KG/M2

## 2024-06-27 DIAGNOSIS — E08.65 DIABETES MELLITUS DUE TO UNDERLYING CONDITION, UNCONTROLLED, WITH HYPERGLYCEMIA: ICD-10-CM

## 2024-06-27 DIAGNOSIS — R45.84 ANHEDONIA: ICD-10-CM

## 2024-06-27 DIAGNOSIS — K76.82 HEPATIC ENCEPHALOPATHY: ICD-10-CM

## 2024-06-27 DIAGNOSIS — K74.69 OTHER CIRRHOSIS OF LIVER: ICD-10-CM

## 2024-06-27 DIAGNOSIS — Z95.828 S/P TIPS (TRANSJUGULAR INTRAHEPATIC PORTOSYSTEMIC SHUNT): ICD-10-CM

## 2024-06-27 PROBLEM — 161615003: Status: ACTIVE | Noted: 2024-06-27

## 2024-06-27 PROCEDURE — 99214 OFFICE O/P EST MOD 30 MIN: CPT | Performed by: INTERNAL MEDICINE

## 2024-06-27 RX ORDER — POTASSIUM CHLORIDE 1500 MG/1
TAKE ONE TABLET BY MOUTH DAILY WITH FOOD TABLET, FILM COATED, EXTENDED RELEASE ORAL
Qty: 30 TABLET | Refills: 0 | Status: ACTIVE | COMMUNITY

## 2024-06-27 RX ORDER — ZOLPIDEM TARTRATE 5 MG/1
1 TABLET AT BEDTIME TABLET, FILM COATED ORAL ONCE A DAY
Status: ACTIVE | COMMUNITY

## 2024-06-27 RX ORDER — AMLODIPINE BESYLATE 5 MG/1
1 TABLET TABLET ORAL ONCE A DAY
Status: ACTIVE | COMMUNITY

## 2024-06-27 RX ORDER — GLIPIZIDE 10 MG/1
1 TABLET 30 MINUTES BEFORE BREAKFAST TABLET ORAL ONCE A DAY
Status: ACTIVE | COMMUNITY

## 2024-06-27 NOTE — HPI-TODAY'S VISIT:
Here to follow up  life has been good, he says  lost a lot of muscle mass  trying to get some protein  doing boost

## 2024-06-28 ENCOUNTER — ERX REFILL RESPONSE (OUTPATIENT)
Dept: URBAN - METROPOLITAN AREA CLINIC 98 | Facility: CLINIC | Age: 69
End: 2024-06-28

## 2024-06-28 RX ORDER — POTASSIUM CHLORIDE 1500 MG/1
TAKE ONE TABLET BY MOUTH DAILY WITH FOOD TABLET, FILM COATED, EXTENDED RELEASE ORAL
Qty: 30 TABLET | Refills: 0 | OUTPATIENT

## 2024-07-19 ENCOUNTER — LAB OUTSIDE AN ENCOUNTER (OUTPATIENT)
Dept: URBAN - METROPOLITAN AREA CLINIC 98 | Facility: CLINIC | Age: 69
End: 2024-07-19

## 2024-07-22 LAB
AFP, SERUM, TUMOR MARKER: 1.8
ALBUMIN: 3.5
ALKALINE PHOSPHATASE: 123
ALT (SGPT): 31
AMMONIA, PLASMA: 103
AST (SGOT): 43
BASO (ABSOLUTE): 0.1
BASOS: 1
BILIRUBIN, TOTAL: 1.4
BUN/CREATININE RATIO: 17
BUN: 13
CALCIUM: 9
CARBON DIOXIDE, TOTAL: 26
CHLORIDE: 103
CREATININE: 0.78
EGFR: 97
EOS (ABSOLUTE): 0.2
EOS: 3
GLOBULIN, TOTAL: 1.9
GLUCOSE: 263
HEMATOCRIT: 39.4
HEMATOLOGY COMMENTS:: (no result)
HEMOGLOBIN: 14.3
IMMATURE CELLS: (no result)
IMMATURE GRANS (ABS): 0
IMMATURE GRANULOCYTES: 0
INR: 1.1
LYMPHS (ABSOLUTE): 1.9
LYMPHS: 30
MCH: 34.2
MCHC: 36.3
MCV: 94
MONOCYTES(ABSOLUTE): 0.5
MONOCYTES: 8
NEUTROPHILS (ABSOLUTE): 3.7
NEUTROPHILS: 58
NRBC: (no result)
PLATELETS: 107
POTASSIUM: 3.8
PROTEIN, TOTAL: 5.4
PROTHROMBIN TIME: 11.8
RBC: 4.18
RDW: 11.8
SODIUM: 139
WBC: 6.4

## 2024-07-25 ENCOUNTER — TELEPHONE ENCOUNTER (OUTPATIENT)
Dept: URBAN - METROPOLITAN AREA CLINIC 98 | Facility: CLINIC | Age: 69
End: 2024-07-25

## 2024-07-26 ENCOUNTER — LAB OUTSIDE AN ENCOUNTER (OUTPATIENT)
Dept: URBAN - METROPOLITAN AREA CLINIC 98 | Facility: CLINIC | Age: 69
End: 2024-07-26

## 2024-07-27 ENCOUNTER — TELEPHONE ENCOUNTER (OUTPATIENT)
Dept: URBAN - METROPOLITAN AREA CLINIC 98 | Facility: CLINIC | Age: 69
End: 2024-07-27

## 2024-12-13 ENCOUNTER — ERX REFILL RESPONSE (OUTPATIENT)
Dept: URBAN - METROPOLITAN AREA CLINIC 98 | Facility: CLINIC | Age: 69
End: 2024-12-13

## 2024-12-13 RX ORDER — RIFAXIMIN 550 MG/1
TAKE ONE TABLET BY MOUTH TWICE A DAY TABLET ORAL
Qty: 180 TABLET | Refills: 2 | OUTPATIENT

## 2024-12-13 RX ORDER — RIFAXIMIN 550 MG/1
TAKE ONE TABLET BY MOUTH TWICE A DAY TABLET ORAL
Qty: 180 TABLET | Refills: 3 | OUTPATIENT

## 2025-01-03 ENCOUNTER — LAB OUTSIDE AN ENCOUNTER (OUTPATIENT)
Dept: URBAN - METROPOLITAN AREA CLINIC 98 | Facility: CLINIC | Age: 70
End: 2025-01-03

## 2025-01-03 ENCOUNTER — OFFICE VISIT (OUTPATIENT)
Dept: URBAN - METROPOLITAN AREA CLINIC 98 | Facility: CLINIC | Age: 70
End: 2025-01-03

## 2025-01-03 VITALS
BODY MASS INDEX: 22.91 KG/M2 | HEART RATE: 83 BPM | WEIGHT: 151.2 LBS | HEIGHT: 68 IN | DIASTOLIC BLOOD PRESSURE: 87 MMHG | SYSTOLIC BLOOD PRESSURE: 145 MMHG | TEMPERATURE: 98.4 F

## 2025-01-03 DIAGNOSIS — R45.84 ANHEDONIA: ICD-10-CM

## 2025-01-03 DIAGNOSIS — Z95.828 S/P TIPS (TRANSJUGULAR INTRAHEPATIC PORTOSYSTEMIC SHUNT): ICD-10-CM

## 2025-01-03 DIAGNOSIS — K76.82 HEPATIC ENCEPHALOPATHY: ICD-10-CM

## 2025-01-03 DIAGNOSIS — K74.69 OTHER CIRRHOSIS OF LIVER: ICD-10-CM

## 2025-01-03 DIAGNOSIS — K86.2 PANCREATIC CYST: ICD-10-CM

## 2025-01-03 PROCEDURE — 99214 OFFICE O/P EST MOD 30 MIN: CPT | Performed by: INTERNAL MEDICINE

## 2025-01-03 RX ORDER — ZOLPIDEM TARTRATE 5 MG/1
1 TABLET AT BEDTIME TABLET, FILM COATED ORAL ONCE A DAY
Status: ACTIVE | COMMUNITY

## 2025-01-03 RX ORDER — AMLODIPINE BESYLATE 5 MG/1
1 TABLET TABLET ORAL ONCE A DAY
Status: ACTIVE | COMMUNITY

## 2025-01-03 RX ORDER — RIFAXIMIN 550 MG/1
TAKE ONE TABLET BY MOUTH TWICE A DAY TABLET ORAL
Qty: 180 TABLET | Refills: 2 | Status: ACTIVE | COMMUNITY

## 2025-01-03 RX ORDER — POTASSIUM CHLORIDE 1500 MG/1
TAKE ONE TABLET BY MOUTH DAILY WITH FOOD TABLET, FILM COATED, EXTENDED RELEASE ORAL
Qty: 30 TABLET | Refills: 0 | Status: ACTIVE | COMMUNITY

## 2025-01-03 RX ORDER — GLIPIZIDE 10 MG/1
1 TABLET 30 MINUTES BEFORE BREAKFAST TABLET ORAL ONCE A DAY
Status: ACTIVE | COMMUNITY

## 2025-01-03 NOTE — HPI-TODAY'S VISIT:
Here to follow up for cirrhosis ; routine visit ; still dealing  with DM 2; now on Mounjaro with improved BG levels, he reports. started as well on anti depressant but still w days that he feels unable to leave his bed.

## 2025-01-04 LAB
AFP, SERUM, TUMOR MARKER: 2.1
ALBUMIN: 4.3
ALKALINE PHOSPHATASE: 123
ALT (SGPT): 25
AST (SGOT): 32
BASO (ABSOLUTE): 0.1
BASOS: 1
BILIRUBIN, TOTAL: 2.1
BUN/CREATININE RATIO: 16
BUN: 13
CALCIUM: 9.7
CARBON DIOXIDE, TOTAL: 27
CHLORIDE: 103
CREATININE: 0.79
EGFR: 96
EOS (ABSOLUTE): 0.2
EOS: 4
GGT: 62
GLOBULIN, TOTAL: 2
GLUCOSE: 188
HEMATOCRIT: 46
HEMATOLOGY COMMENTS:: (no result)
HEMOGLOBIN: 16.5
IMMATURE CELLS: (no result)
IMMATURE GRANS (ABS): 0.1
IMMATURE GRANULOCYTES: 1
INR: 1.2
LYMPHS (ABSOLUTE): 1.6
LYMPHS: 25
MCH: 35
MCHC: 35.9
MCV: 98
MONOCYTES(ABSOLUTE): 0.5
MONOCYTES: 9
NEUTROPHILS (ABSOLUTE): 3.9
NEUTROPHILS: 60
NRBC: (no result)
PLATELETS: 116
POTASSIUM: 3.8
PROTEIN, TOTAL: 6.3
PROTHROMBIN TIME: 13
RBC: 4.71
RDW: 12.8
SODIUM: 143
WBC: 6.3

## 2025-01-05 ENCOUNTER — TELEPHONE ENCOUNTER (OUTPATIENT)
Dept: URBAN - METROPOLITAN AREA CLINIC 98 | Facility: CLINIC | Age: 70
End: 2025-01-05

## 2025-01-13 ENCOUNTER — TELEPHONE ENCOUNTER (OUTPATIENT)
Dept: URBAN - METROPOLITAN AREA CLINIC 97 | Facility: CLINIC | Age: 70
End: 2025-01-13

## 2025-01-15 ENCOUNTER — LAB OUTSIDE AN ENCOUNTER (OUTPATIENT)
Dept: URBAN - METROPOLITAN AREA CLINIC 98 | Facility: CLINIC | Age: 70
End: 2025-01-15

## 2025-01-16 ENCOUNTER — TELEPHONE ENCOUNTER (OUTPATIENT)
Dept: URBAN - METROPOLITAN AREA CLINIC 98 | Facility: CLINIC | Age: 70
End: 2025-01-16

## 2025-01-23 ENCOUNTER — TELEPHONE ENCOUNTER (OUTPATIENT)
Dept: URBAN - METROPOLITAN AREA CLINIC 98 | Facility: CLINIC | Age: 70
End: 2025-01-23

## 2025-07-11 ENCOUNTER — LAB OUTSIDE AN ENCOUNTER (OUTPATIENT)
Dept: URBAN - METROPOLITAN AREA CLINIC 98 | Facility: CLINIC | Age: 70
End: 2025-07-11

## 2025-07-11 ENCOUNTER — OFFICE VISIT (OUTPATIENT)
Dept: URBAN - METROPOLITAN AREA CLINIC 98 | Facility: CLINIC | Age: 70
End: 2025-07-11
Payer: MEDICARE

## 2025-07-11 DIAGNOSIS — K74.69 OTHER CIRRHOSIS OF LIVER: ICD-10-CM

## 2025-07-11 DIAGNOSIS — K76.82 HEPATIC ENCEPHALOPATHY: ICD-10-CM

## 2025-07-11 DIAGNOSIS — R45.84 ANHEDONIA: ICD-10-CM

## 2025-07-11 DIAGNOSIS — Z95.828 S/P TIPS (TRANSJUGULAR INTRAHEPATIC PORTOSYSTEMIC SHUNT): ICD-10-CM

## 2025-07-11 DIAGNOSIS — K86.2 PANCREATIC CYST: ICD-10-CM

## 2025-07-11 PROCEDURE — 99214 OFFICE O/P EST MOD 30 MIN: CPT | Performed by: INTERNAL MEDICINE

## 2025-07-11 RX ORDER — GLIPIZIDE 10 MG/1
1 TABLET 30 MINUTES BEFORE BREAKFAST TABLET ORAL ONCE A DAY
Status: ACTIVE | COMMUNITY

## 2025-07-11 RX ORDER — RIFAXIMIN 550 MG/1
TAKE ONE TABLET BY MOUTH TWICE A DAY TABLET ORAL
Qty: 180 TABLET | Refills: 2 | Status: ACTIVE | COMMUNITY

## 2025-07-11 RX ORDER — AMLODIPINE BESYLATE 5 MG/1
1 TABLET TABLET ORAL ONCE A DAY
Status: ACTIVE | COMMUNITY

## 2025-07-11 RX ORDER — ZOLPIDEM TARTRATE 5 MG/1
1 TABLET AT BEDTIME TABLET, FILM COATED ORAL ONCE A DAY
Status: ACTIVE | COMMUNITY

## 2025-07-11 RX ORDER — POTASSIUM CHLORIDE 1500 MG/1
TAKE ONE TABLET BY MOUTH DAILY WITH FOOD TABLET, FILM COATED, EXTENDED RELEASE ORAL
Qty: 30 TABLET | Refills: 0 | Status: ACTIVE | COMMUNITY
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
## 2025-07-12 LAB
AFP, SERUM, TUMOR MARKER: 2.2
ALBUMIN: 4.1
ALKALINE PHOSPHATASE: 161
ALT (SGPT): 38
AST (SGOT): 39
BASO (ABSOLUTE): 0.1
BASOS: 1
BILIRUBIN, TOTAL: 1.9
BUN/CREATININE RATIO: 20
BUN: 16
CALCIUM: 9.4
CARBON DIOXIDE, TOTAL: 21
CHLORIDE: 106
CREATININE: 0.81
EGFR: 95
EOS (ABSOLUTE): 0.2
EOS: 3
GLOBULIN, TOTAL: 2.4
GLUCOSE: 154
HEMATOCRIT: 44.7
HEMATOLOGY COMMENTS:: (no result)
HEMOGLOBIN: 15.6
IMMATURE CELLS: (no result)
IMMATURE GRANS (ABS): 0
IMMATURE GRANULOCYTES: 0
INR: 1.2
LYMPHS (ABSOLUTE): 1.5
LYMPHS: 25
MCH: 35.1
MCHC: 34.9
MCV: 101
MONOCYTES(ABSOLUTE): 0.5
MONOCYTES: 9
NEUTROPHILS (ABSOLUTE): 3.8
NEUTROPHILS: 62
NRBC: (no result)
PLATELETS: 133
POTASSIUM: 3.8
PROTEIN, TOTAL: 6.5
PROTHROMBIN TIME: 13
RBC: 4.44
RDW: 12.5
SODIUM: 143
WBC: 6.1

## 2025-07-14 ENCOUNTER — TELEPHONE ENCOUNTER (OUTPATIENT)
Dept: URBAN - METROPOLITAN AREA CLINIC 98 | Facility: CLINIC | Age: 70
End: 2025-07-14

## 2025-07-22 ENCOUNTER — TELEPHONE ENCOUNTER (OUTPATIENT)
Dept: URBAN - METROPOLITAN AREA CLINIC 98 | Facility: CLINIC | Age: 70
End: 2025-07-22

## 2025-07-24 ENCOUNTER — TELEPHONE ENCOUNTER (OUTPATIENT)
Dept: URBAN - METROPOLITAN AREA CLINIC 98 | Facility: CLINIC | Age: 70
End: 2025-07-24

## 2025-08-12 ENCOUNTER — LAB OUTSIDE AN ENCOUNTER (OUTPATIENT)
Dept: URBAN - METROPOLITAN AREA CLINIC 98 | Facility: CLINIC | Age: 70
End: 2025-08-12

## 2025-08-13 ENCOUNTER — TELEPHONE ENCOUNTER (OUTPATIENT)
Dept: URBAN - METROPOLITAN AREA CLINIC 98 | Facility: CLINIC | Age: 70
End: 2025-08-13